# Patient Record
Sex: MALE | Race: WHITE | Employment: OTHER | URBAN - METROPOLITAN AREA
[De-identification: names, ages, dates, MRNs, and addresses within clinical notes are randomized per-mention and may not be internally consistent; named-entity substitution may affect disease eponyms.]

---

## 2018-08-07 ENCOUNTER — OFFICE VISIT (OUTPATIENT)
Dept: FAMILY MEDICINE CLINIC | Facility: CLINIC | Age: 58
End: 2018-08-07

## 2018-08-07 VITALS
OXYGEN SATURATION: 97 % | SYSTOLIC BLOOD PRESSURE: 121 MMHG | BODY MASS INDEX: 39.17 KG/M2 | HEART RATE: 73 BPM | TEMPERATURE: 98.2 F | DIASTOLIC BLOOD PRESSURE: 81 MMHG | HEIGHT: 75 IN | WEIGHT: 315 LBS | RESPIRATION RATE: 17 BRPM

## 2018-08-07 DIAGNOSIS — Z23 ENCOUNTER FOR IMMUNIZATION: ICD-10-CM

## 2018-08-07 DIAGNOSIS — I10 ESSENTIAL HYPERTENSION: Primary | ICD-10-CM

## 2018-08-07 RX ORDER — DILTIAZEM HYDROCHLORIDE 240 MG/1
240 CAPSULE, COATED, EXTENDED RELEASE ORAL DAILY
Qty: 90 CAP | Refills: 1 | Status: SHIPPED | OUTPATIENT
Start: 2018-08-07 | End: 2019-03-01 | Stop reason: SDUPTHER

## 2018-08-07 RX ORDER — METFORMIN HYDROCHLORIDE 500 MG/1
500 TABLET ORAL 2 TIMES DAILY WITH MEALS
COMMUNITY
End: 2018-08-29

## 2018-08-07 RX ORDER — ALBUTEROL SULFATE 4 MG/1
1 TABLET ORAL 2 TIMES DAILY
COMMUNITY
Start: 2018-06-19 | End: 2018-08-29

## 2018-08-07 NOTE — MR AVS SNAPSHOT
[address] 
 
 
 [address] [facility] [address] 
[phone] Patient: Nakita Arteaga MRN: [id number] LAKEISHA:[id number] Visit Information Date & Time Provider Department Dept. Phone Encounter #  
 8/7/2018 10:30 AM LALITHA Suarez [facility] [phone] [id number] Follow-up Instructions Return in about 3 months (around 11/7/2018) for HTN. Your Appointments 8/29/2018  8:00 AM  
PHYSICAL with Malik Rowland MD  
[facility]) Appt Note: physical  
 [address] [facility] [id number]  
[address] [address] Upcoming Health Maintenance Date Due Hepatitis C Screening 1960 DTaP/Tdap/Td series (1 - Tdap) 8/13/1979 FOBT Q 1 YEAR AGE 50-75 8/13/2008 ZOSTER VACCINE AGE 60> 6/13/2018 Influenza Age 5 to Adult 8/1/2018 Allergies as of 8/7/2018  Review Complete On: 8/7/2018 By: Beto Blunt Severity Noted Reaction Type Reactions Gabapentin High 08/07/2018   Side Effect Other (comments) Mental Disturbances Lyrica [Pregabalin] High 08/07/2018   Side Effect Other (comments) Mental Disturbances Current Immunizations  Never Reviewed Name Date Influenza Vaccine 9/6/2015 Pneumococcal Vaccine (Unspecified Type) 9/6/2014 Not reviewed this visit You Were Diagnosed With   
  
 Codes Comments Essential hypertension    -  Primary ICD-10-CM: I10 
ICD-9-CM: 401.9 BMI 40.0-44.9, adult [facility])     ICD-10-CM: Z68.41 
ICD-9-CM: V85.41 Encounter for immunization     ICD-10-CM: Z69 ICD-9-CM: V03.89 Vitals BP Pulse Temp Resp Height(growth percentile) Weight(growth percentile) 121/81 (BP 1 Location: Right arm, BP Patient Position: Sitting) 73 98.2 °F (36.8 °C) (Oral) 17 6' 3\" (1.905 m) 349 lb (158.3 kg) SpO2 BMI Smoking Status 97% 43.62 kg/m2 Former Smoker Vitals History BMI and BSA Data Body Mass Index Body Surface Area  
 43.62 kg/m 2 2.89 m 2 Preferred Pharmacy Pharmacy Name Phone [phone], [address] [place] [address] [phone] Your Updated Medication List  
  
   
This list is accurate as of 18 11:34 AM.  Always use your most recent med list.  
  
  
  
  
 albuterol 4 mg tablet Commonly known as:  PROVENTIL Take 1 Tab by mouth two (2) times a day. dilTIAZem  mg ER capsule Commonly known as:  CARTIA XT Take 1 Cap by mouth daily. gabapentin 300 mg capsule Commonly known as:  NEURONTIN Take 300 mg by mouth three (3) times daily. metFORMIN 500 mg tablet Commonly known as:  GLUCOPHAGE Take 500 mg by mouth two (2) times daily (with meals). multivitamin tablet Commonly known as:  ONE A DAY Take 1 Tab by mouth daily. oxyCODONE-acetaminophen 5-325 mg per tablet Commonly known as:  PERCOCET Take 1 Tab by mouth every four (4) hours as needed for Pain. Max Daily Amount: 6 Tabs. OXYCONTIN PO Take  by mouth.  
  
 varicella-zoster recombinant (PF) 50 mcg/0.5 mL Susr injection Commonly known as:  SHINGRIX  
0.5 mL by IntraMUSCular route once for 1 dose. Prescriptions Sent to Pharmacy Refills  
 dilTIAZem CD (CARTIA XT) 240 mg ER capsule 1 Sig: Take 1 Cap by mouth daily. Class: Normal  
 Pharmacy: [address] [place] [address]. Ph #: [phone] Route: Oral  
 varicella-zoster recombinant, PF, (SHINGRIX) 50 mcg/0.5 mL susr injection 0 Si.5 mL by IntraMUSCular route once for 1 dose. Class: Normal  
 Pharmacy: [address] [place] [address]. Ph #: [phone] Route: IntraMUSCular Follow-up Instructions Return in about 3 months (around 2018) for HTN. Introducing [facility]! Carina Vega introduces [employer] patient portal. Now you can access parts of your medical record, email your doctor's office, and request medication refills online. 1. In your internet browser, go to [email or web address] 2. Click on the First Time User? Click Here link in the Sign In box. You will see the New Member Sign Up page. 3. Enter your [employer] Access Code exactly as it appears below. You will not need to use this code after youve completed the sign-up process. If you do not sign up before the expiration date, you must request a new code. · [employer] Access Code: [email or web address] Expires: 11/5/2018 11:34 AM 
 
4. Enter the last four digits of your Social Security Number (xxxx) and Date of Birth (mm/dd/yyyy) as indicated and click Submit. You will be taken to the next sign-up page. 5. Create a [employer] ID. This will be your [employer] login ID and cannot be changed, so think of one that is secure and easy to remember. 6. Create a [employer] password. You can change your password at any time. 7. Enter your Password Reset Question and Answer. This can be used at a later time if you forget your password. 8. Enter your e-mail address. You will receive e-mail notification when new information is available in [address] 9. Click Sign Up. You can now view and download portions of your medical record. 10. Click the Download Summary menu link to download a portable copy of your medical information. If you have questions, please visit the Frequently Asked Questions section of the [employer] website. Remember, [employer] is NOT to be used for urgent needs. For medical emergencies, dial 911. Now available from your iPhone and Android! Please provide this summary of care documentation to your next provider. Your primary care clinician is listed as Moise Mayorga. If you have any questions after today's visit, please call [phone].

## 2018-08-07 NOTE — PROGRESS NOTES
Berkley Logan is a 59 y.o.  male presents today for office visit for   Chief Complaint   Patient presents with    Hypertension   Pt is fasting. Pt is in Room # 5.      1. Have you been to the ER, urgent care clinic since your last visit? Hospitalized since your last visit? No    2. Have you seen or consulted any other health care providers outside of the [address] [place] [address] since your last visit? Include any pap smears or colon screening. Yes Dermatology 3/2018, Weight Loss Program 7/2018    Health Maintenance reviewed - pending records from Dr. Samanta Christine office. Requested Prescriptions     Pending Prescriptions Disp Refills    dilTIAZem XR (DILACOR XR) 180 mg XR capsule       Sig: Take 1 Cap by mouth daily.        Visit Vitals    /81 (BP 1 Location: Right arm, BP Patient Position: Sitting)    Pulse 73    Temp 98.2 °F (36.8 °C) (Oral)    Resp 17    Ht 6' 3\" (1.905 m)    Wt 349 lb (158.3 kg)    SpO2 97%    BMI 43.62 kg/m2         Upcoming Appts  Yes Weight Loss 8/2018

## 2018-08-07 NOTE — PROGRESS NOTES
History:   Clay Hill is a 59 y.o. male presenting today for an initial visit. Mr. Deborah Leonard presents today to establish care. History includes HTN, morbid obesity, and chronic pain. In terms of HTN, he is taking Cartia XT daily with good control of BP. He denies headache, dizziness, SOB, chest pain, palpitations, orthopnea, pnd, or leg swelling. He is currently followed by a weight loss specialist for history of morbid obesity. Current BMI is 43.62. He reports an 8 lbs intentional weight loss over the past 2 weeks. Pt reports that he is a retired  and sustained multiple injuries over the years involving his neck, back, hips, knees, and shoulder during his service requiring 52 orthopedic surgeries over several years. He was previously followed by pain management and was treated with oxycodone. Pt reports that he no longer takes opioid medication and that his pain is controlled. He has no acute concerns/complaints at this time. Health maintenance: Pt reports that he will obtain the influenza vaccine. He states that he is up to date with tetanus. Reports prior colonoscopy and PSA level performed recently (no records available at this time). Past Medical History:   Diagnosis Date    Cervical pain (neck)     Chronic pain     Headache     Hypertension        Past Surgical History:   Procedure Laterality Date    HX ANKLE FRACTURE TX Left     SX. X4    HX CERVICAL DISKECTOMY      HX CERVICAL FUSION  2004    HX HIP REPLACEMENT Left Feb. 2015    after fall on ice    HX KNEE ARTHROSCOPY      HX LUMBAR DISKECTOMY  1990    L4-5, L5-S1 decompression    HX ORTHOPAEDIC      FEMUR FX    HX SHOULDER ARTHROSCOPY         Social History     Social History    Marital status:      Spouse name: N/A    Number of children: N/A    Years of education: N/A     Occupational History    Not on file.      Social History Main Topics    Smoking status: Former Smoker     Packs/day: 1.00 Years: 10.00     Quit date: 1/1/1980    Smokeless tobacco: Never Used    Alcohol use Yes      Comment: rarely    Drug use: No    Sexual activity: Yes     Partners: Female     Birth control/ protection: None     Other Topics Concern    Not on file     Social History Narrative       Family History   Problem Relation Age of Onset    Diabetes Mother     Cancer Mother     Arthritis-osteo Mother     Alcohol abuse Father     Cancer Sister      Non -small cell lung cancer    Cancer Brother      Micheal    No Known Problems Brother     No Known Problems Brother     No Known Problems Sister     No Known Problems Child     No Known Problems Child        Current Outpatient Prescriptions on File Prior to Visit   Medication Sig Dispense Refill    diltiazem XR (DILACOR XR) 180 mg XR capsule Take 240 mg by mouth daily.  multivitamin (ONE A DAY) tablet Take 1 Tab by mouth daily.  oxyCODONE-acetaminophen (PERCOCET) 5-325 mg per tablet Take 1 Tab by mouth every four (4) hours as needed for Pain. Max Daily Amount: 6 Tabs. 40 Tab 0    gabapentin (NEURONTIN) 300 mg capsule Take 300 mg by mouth three (3) times daily.  OXYCODONE HCL (OXYCONTIN PO) Take  by mouth. No current facility-administered medications on file prior to visit. Allergies   Allergen Reactions    Gabapentin Other (comments)     Mental Disturbances    Lyrica [Pregabalin] Other (comments)     Mental Disturbances       Review of Systems   Constitutional: Negative. HENT: Negative. Eyes: Negative. Respiratory: Negative. Cardiovascular: Negative. Gastrointestinal: Negative. Genitourinary: Negative. Musculoskeletal: Negative. Skin: Negative. Neurological: Negative. Endo/Heme/Allergies: Negative. Psychiatric/Behavioral: Negative.         Objective:   VS:    Visit Vitals    /81 (BP 1 Location: Right arm, BP Patient Position: Sitting)    Pulse 73    Temp 98.2 °F (36.8 °C) (Oral)    Resp 17  Ht 6' 3\" (1.905 m)    Wt 349 lb (158.3 kg)    SpO2 97%    BMI 43.62 kg/m2     Physical Exam   Constitutional: He is oriented to person, place, and time. He appears well-developed and well-nourished. Pt is morbidly obese   HENT:   Head: Normocephalic and atraumatic. Mouth/Throat: Oropharynx is clear and moist.   Eyes: Conjunctivae and EOM are normal. Pupils are equal, round, and reactive to light. Neck: Normal range of motion. Neck supple. Cardiovascular: Normal rate, regular rhythm, normal heart sounds and intact distal pulses. Pulmonary/Chest: Effort normal and breath sounds normal.   Abdominal: Soft. Bowel sounds are normal. He exhibits no distension. There is no tenderness. Musculoskeletal: Normal range of motion. He exhibits no edema. Neurological: He is alert and oriented to person, place, and time. Skin: Skin is warm and dry. Nursing note and vitals reviewed. Assessment/ Plan:     Diagnoses and all orders for this visit:    1. Essential hypertension        -     Controlled  -     dilTIAZem CD (CARTIA XT) 240 mg ER capsule; Take 1 Cap by mouth daily. 2. BMI 40.0-44.9, adult (HCC)        -     Continue efforts with diet and exercise        -     Follow up with weight loss specialist as directed    3. Encounter for immunization  -     varicella-zoster recombinant, PF, (SHINGRIX) 50 mcg/0.5 mL susr injection; 0.5 mL by IntraMUSCular route once for 1 dose. I have discussed the diagnosis with the patient and the intended plan as seen in the above orders. The patient verbalized understanding and agrees with the plan.       Follow-up Disposition: Not on [facility], MD

## 2018-08-29 ENCOUNTER — OFFICE VISIT (OUTPATIENT)
Dept: FAMILY MEDICINE CLINIC | Facility: CLINIC | Age: 58
End: 2018-08-29

## 2018-08-29 VITALS
HEART RATE: 70 BPM | WEIGHT: 315 LBS | SYSTOLIC BLOOD PRESSURE: 125 MMHG | DIASTOLIC BLOOD PRESSURE: 80 MMHG | OXYGEN SATURATION: 95 % | RESPIRATION RATE: 18 BRPM | HEIGHT: 75 IN | TEMPERATURE: 98.3 F | BODY MASS INDEX: 39.17 KG/M2

## 2018-08-29 DIAGNOSIS — Z00.00 ROUTINE GENERAL MEDICAL EXAMINATION AT A HEALTH CARE FACILITY: Primary | ICD-10-CM

## 2018-08-29 DIAGNOSIS — I10 ESSENTIAL HYPERTENSION: ICD-10-CM

## 2018-08-29 DIAGNOSIS — Z23 ENCOUNTER FOR IMMUNIZATION: ICD-10-CM

## 2018-08-29 DIAGNOSIS — M48.061 SPINAL STENOSIS OF LUMBAR REGION, UNSPECIFIED WHETHER NEUROGENIC CLAUDICATION PRESENT: ICD-10-CM

## 2018-08-29 DIAGNOSIS — E66.01 OBESITY, CLASS III, BMI 40-49.9 (MORBID OBESITY) (HCC): ICD-10-CM

## 2018-08-29 DIAGNOSIS — Z12.5 SCREENING FOR PROSTATE CANCER: ICD-10-CM

## 2018-08-29 DIAGNOSIS — Z13.31 SCREENING FOR DEPRESSION: ICD-10-CM

## 2018-08-29 RX ORDER — METAPROTERENOL SULFATE 20 MG
TABLET ORAL
COMMUNITY

## 2018-08-29 RX ORDER — ASPIRIN 81 MG/1
81 TABLET ORAL DAILY
COMMUNITY

## 2018-08-29 RX ORDER — ZOSTER VACCINE RECOMBINANT, ADJUVANTED 50 MCG/0.5
KIT INTRAMUSCULAR
COMMUNITY
Start: 2018-08-14 | End: 2018-08-29

## 2018-08-29 NOTE — PATIENT INSTRUCTIONS
Vaccine Information Statement Influenza (Flu) Vaccine (Inactivated or Recombinant): What you need to know Many Vaccine Information Statements are available in Green[place]ic and other languages. See www.immunize.org/vis Hojas de Información Sobre Vacunas están disponibles en Español y en muchos otros idiomas. Visite www.immunize.org/vis 1. Why get vaccinated? Influenza (flu) is a contagious disease that spreads around the [place] every year, usually between October and May. Flu is caused by influenza viruses, and is spread mainly by coughing, sneezing, and close contact. Anyone can get flu. Flu strikes suddenly and can last several days. Symptoms vary by age, but can include: 
 fever/chills  sore throat  muscle aches  fatigue  cough  headache  runny or stuffy nose Flu can also lead to pneumonia and blood infections, and cause diarrhea and seizures in children. If you have a medical condition, such as heart or lung disease, flu can make it worse. Flu is more dangerous for some people. Infants and young children, people 72years of age and older, pregnant women, and people with certain health conditions or a weakened immune system are at greatest risk. Each year thousands of people in the [facility] die from flu, and many more are hospitalized. Flu vaccine can: 
 keep you from getting flu, 
 make flu less severe if you do get it, and 
 keep you from spreading flu to your family and other people. 2. Inactivated and recombinant flu vaccines A dose of flu vaccine is recommended every flu season. Children 6 months through 6years of age may need two doses during the same flu season. Everyone else needs only one dose each flu season.   
 
 
Some inactivated flu vaccines contain a very small amount of a mercury-based preservative called thimerosal. Studies have not shown thimerosal in vaccines to be harmful, but flu vaccines that do not contain thimerosal are available. There is no live flu virus in flu shots. They cannot cause the flu. There are many flu viruses, and they are always changing. Each year a new flu vaccine is made to protect against three or four viruses that are likely to cause disease in the upcoming flu season. But even when the vaccine doesnt exactly match these viruses, it may still provide some protection Flu vaccine cannot prevent: 
 flu that is caused by a virus not covered by the vaccine, or 
 illnesses that look like flu but are not. It takes about 2 weeks for protection to develop after vaccination, and protection lasts through the flu season. 3. Some people should not get this vaccine Tell the person who is giving you the vaccine:  If you have any severe, life-threatening allergies. If you ever had a life-threatening allergic reaction after a dose of flu vaccine, or have a severe allergy to any part of this vaccine, you may be advised not to get vaccinated. Most, but not all, types of flu vaccine contain a small amount of egg protein.  If you ever had Guillain-Barré Syndrome (also called GBS). Some people with a history of GBS should not get this vaccine. This should be discussed with your doctor.  If you are not feeling well. It is usually okay to get flu vaccine when you have a mild illness, but you might be asked to come back when you feel better. 4. Risks of a vaccine reaction With any medicine, including vaccines, there is a chance of reactions. These are usually mild and go away on their own, but serious reactions are also possible. Most people who get a flu shot do not have any problems with it. Minor problems following a flu shot include:  
 soreness, redness, or swelling where the shot was given  hoarseness  sore, red or itchy eyes  cough  fever  aches  headache  itching  fatigue If these problems occur, they usually begin soon after the shot and last 1 or 2 days. More serious problems following a flu shot can include the following:  There may be a small increased risk of Guillain-Barré Syndrome (GBS) after inactivated flu vaccine. This risk has been estimated at 1 or 2 additional cases per million people vaccinated. This is much lower than the risk of severe complications from flu, which can be prevented by flu vaccine.  Young children who get the flu shot along with pneumococcal vaccine (PCV13) and/or DTaP vaccine at the same time might be slightly more likely to have a seizure caused by fever. Ask your doctor for more information. Tell your doctor if a child who is getting flu vaccine has ever had a seizure. Problems that could happen after any injected vaccine:  People sometimes faint after a medical procedure, including vaccination. Sitting or lying down for about 15 minutes can help prevent fainting, and injuries caused by a fall. Tell your doctor if you feel dizzy, or have vision changes or ringing in the ears.  Some people get severe pain in the shoulder and have difficulty moving the arm where a shot was given. This happens very rarely.  Any medication can cause a severe allergic reaction. Such reactions from a vaccine are very rare, estimated at about 1 in a million doses, and would happen within a few minutes to a few hours after the vaccination. As with any medicine, there is a very remote chance of a vaccine causing a serious injury or death. The safety of vaccines is always being monitored. For more information, visit: www.cdc.gov/vaccinesafety/ 
 
5. What if there is a serious reaction? What should I look for?  Look for anything that concerns you, such as signs of a severe allergic reaction, very high fever, or unusual behavior.  
 
Signs of a severe allergic reaction can include hives, swelling of the face and throat, difficulty breathing, a fast heartbeat, dizziness, and weakness  usually within a few minutes to a few hours after the vaccination. What should I do?  If you think it is a severe allergic reaction or other emergency that cant wait, call 9-1-1 and get the person to the nearest hospital. Otherwise, call your doctor.  Reactions should be reported to the Vaccine Adverse Event Reporting System (VAERS). Your doctor should file this report, or you can do it yourself through  the VAERS web site at www.vaers. [facility].gov, or by calling 2-234.497.4937. VAERS does not give medical advice. 6. The National Vaccine Injury Compensation Program 
 
The [facility] Vaccine Injury Compensation Program (VICP) is a federal program that was created to compensate people who may have been injured by certain vaccines. Persons who believe they may have been injured by a vaccine can learn about the program and about filing a claim by calling 6-449.850.1565 or visiting the [employer] website at www.[facility].gov/vaccinecompensation. There is a time limit to file a claim for compensation. 7. How can I learn more?  Ask your healthcare provider. He or she can give you the vaccine package insert or suggest other sources of information.  Call your local or state health department.  Contact the Centers for Disease Control and Prevention (CDC): 
- Call 2-910.601.8813 (1-800-CDC-INFO) or 
- Visit CDCs website at www.cdc.gov/flu Vaccine Information Statement Inactivated Influenza Vaccine 8/7/2015 
42 JEANMARIE Blackwell [id number]-20 Department of [facility] and [employer] Centers for Disease Control and Prevention Office Use Only

## 2018-08-29 NOTE — PROGRESS NOTES
History and Physical 
 
Today's Date:  2018 Patient's Name: Gregoria Juárez Patient's :  1960 History: Chief Complaint Patient presents with  Annual Exam  
 Hypertension  Weight Management  Immunization/Injection Hypertension This is a chronic problem, new to me. BP is at goal. Pt takes dilitiazem. Pt reports compliance with this medication. Obesity Class III This is a chronic problem, new to me. This is not at goal. Pt went to the [facility] Weight loss program. He was prescribed albuterol and metformin. These are not effective. Diffuse pain This is a chronic problem, new to me. This is not at goal. Pt reports pain all over, especially his neck. +paresthesias. Pt takes glucosamine. Past Medical History:  
Diagnosis Date  Cervical pain (neck)  Chronic pain  Headache  Hypertension Past Surgical History:  
Procedure Laterality Date  HX ANKLE FRACTURE TX Left SX. X4  
 HX CERVICAL DISKECTOMY  HX CERVICAL FUSION    HX HIP REPLACEMENT Left 2015  
 after fall on ice  HX KNEE ARTHROSCOPY    
 HX LUMBAR DISKECTOMY   L4-5, L5-S1 decompression  HX ORTHOPAEDIC FEMUR FX  
 HX SHOULDER ARTHROSCOPY    
 
 reports that he quit smoking about 38 years ago. He has a 10.00 pack-year smoking history. He has never used smokeless tobacco. He reports that he drinks alcohol. He reports that he does not use illicit drugs. Family History Problem Relation Age of Onset  Diabetes Mother  Cancer Mother Viri Aydee Arthritis-osteo Mother  Alcohol abuse Father  Cancer Sister Non -small cell lung cancer  Cancer Brother Koki Romero  No Known Problems Brother  No Known Problems Brother  No Known Problems Sister  No Known Problems Child  No Known Problems Child Allergies Allergen Reactions  Gabapentin Other (comments) Mental Disturbances  Lyrica [Pregabalin] Other (comments) Mental Disturbances Problem List:  
  
Patient Active Problem List  
Diagnosis Code  Lumbar stenosis M48.061  
 Hypertension I10  
 Obesity, Class III, BMI 40-49.9 (morbid obesity) ([facility]) E66.01 Medications:  
 
Current Outpatient Prescriptions Medication Sig  
 aspirin delayed-release 81 mg tablet Take  by mouth daily.  Vsolhjsc-Fler-Kedjsk-Hyalur Ac (JOINT SUPPORT) 397-254-55-2 mg cap Take  by mouth.  dilTIAZem CD (CARTIA XT) 240 mg ER capsule Take 1 Cap by mouth daily.  multivitamin (ONE A DAY) tablet Take 1 Tab by mouth daily. No current facility-administered medications for this visit. Review of Systems:  
(Positives in bold) General:   fevers, chills, generalized weakness, fatigue, weight gain, night sweats, appetite change Neurologic: dizziness, lightheadedness, headaches, loss of consciousness, numbness, tingling (b/l legs and feet, right more than left), focal weakness Eyes:  vision changes, double vision, photophobia Ears:  change in hearing (b/l, saw ENT), ear pain, ear discharge, ear ringing Nose:  sneezing, runny nose, nasal congestion Mouth/Throat: sore throat, voice change, dry mouth, difficulty swallowing Neck:  pain, stiffness, swelling Respiratory: dyspnea at rest, dyspnea on exertion, wheezing, cough, sputum production Cardiovascular:   chest pain, palpitations, pedal edema, leg cramps Gastrointestinal:  nausea, vomiting, abdominal pain, constipation, diarrhea (attributed to metformin), heart burn, bloody stools, tarry black stools, rectal pain, hemorrhoids Urinary: dysuria, urinary frequency, nocturia, malodorous urine, difficulty initiating flow, slow urine stream 
Genital (M): penile discharge, ulcerations, rashes, erectile dysfunction Musculoskeletal:  joint pain, joint stiffness, joint swelling, back pain, focal muscle pain, diffuse myalgias Psychiatric: insomnia, anxiety, depression, hallucinations, suicidal ideation, homicidal ideation Endocrine: polydipsia, polyuria, polyphagia, cold intolerance, heat intolerance Hematologic: easy bruising, easy bleeding Dermatologic: Itching, rash Physical Assessment:  
VS:   
Visit Vitals  /80 (BP 1 Location: Right arm, BP Patient Position: Sitting)  Pulse 70  Temp 98.3 °F (36.8 °C) (Oral)  Resp 18  Ht 6' 3\" (1.905 m)  Wt 347 lb 9.6 oz (157.7 kg)  SpO2 95%  BMI 43.45 kg/m2 General:   Well-groomed, well-nourished, in no distress, pleasant, alert, appropriate and conversant. Eyes:    PERRL, EOMI. Mouth:  MMM, good dentition, oropharynx WNL without membranes, exudates, petechiae or ulcers Neck:   Neck supple, no swelling, mass or tenderness Cardiovascular:   No JVD. RRR, no MRG. Pulmonary:   Lungs clear bilaterally. Normal respiratory effort. Abdomen:   Abdomen soft, NT, ND, NAB Extremities:   No edema, LEs warm and well-perfused. Neuro:   Alert and oriented, no focal deficits. No facial asymmetry noted. Skin:    No rash or jaundice MSK:   Normal ROM, 5/5 muscle strength Psych:  No pressured speech or abnormal thought content PHQ over the last two weeks 8/29/2018 Little interest or pleasure in doing things Not at all Feeling down, depressed, irritable, or hopeless Not at all Total Score PHQ 2 0 Lab Results Component Value Date/Time WBC 5.5 02/26/2016 10:55 AM  
 HGB 14.9 02/26/2016 10:55 AM  
 HCT 45.6 02/26/2016 10:55 AM  
 PLATELET 728 40/73/5201 10:55 AM  
 MCV 88.9 02/26/2016 10:55 AM  
 
Lab Results Component Value Date/Time Glucose 93 02/26/2016 10:55 AM  
 Creatinine 0.96 02/26/2016 10:55 AM  
  
No results found for: CHOL, CHOLPOCT, HDL, LDL, LDLC, LDLCPOC, LDLCEXT, TRIGL, TGLPOCT, CHHD, CHHDX Lab Results Component Value Date/Time ALT (SGPT) 51 02/26/2016 10:55 AM  
 AST (SGOT) 32 02/26/2016 10:55 AM  
 Alk.  phosphatase 62 02/26/2016 10:55 AM  
 Bilirubin, total 0.8 02/26/2016 10:55 AM  
 Albumin 4.0 02/26/2016 10:55 AM  
 Protein, total 6.9 02/26/2016 10:55 AM  
 INR 1.0 02/26/2016 10:55 AM  
 Prothrombin time 13.4 02/26/2016 10:55 AM  
 PLATELET 502 01/21/6521 10:55 AM  
 
 
Lab Results Component Value Date/Time GFR est non-AA >60 02/26/2016 10:55 AM  
 GFR est AA >60 02/26/2016 10:55 AM  
 Creatinine 0.96 02/26/2016 10:55 AM  
 BUN 15 02/26/2016 10:55 AM  
 Sodium 142 02/26/2016 10:55 AM  
 Potassium 4.4 02/26/2016 10:55 AM  
 Chloride 108 02/26/2016 10:55 AM  
 CO2 28 02/26/2016 10:55 AM  
 
No results found for: TSH, TSH2, TSH3, TSHP, TSHELE, TSHEXT, TT3, T3U, T3UP, FRT3, FT3, FT4, FT4P, T4, T4P, FT4T, TT7, TSHEXT, TSHEXT Lab Results Component Value Date/Time Glucose 93 02/26/2016 10:55 AM  
  
Assessment/Plan & Orders: ICD-10-CM ICD-9-CM 1. Routine general medical examination at a health care facility Z00.00 V70.0 CBC WITH AUTOMATED DIFF  
   LIPID PANEL  
   METABOLIC PANEL, COMPREHENSIVE  
   TSH 3RD GENERATION  
   T4, FREE  
   HEMOGLOBIN A1C WITH EAG  
   CBC WITH AUTOMATED DIFF  
   LIPID PANEL  
   METABOLIC PANEL, COMPREHENSIVE  
   TSH 3RD GENERATION  
   T4, FREE  
   HEMOGLOBIN A1C WITH EAG 2. Essential hypertension I10 401.9 3. Obesity, Class III, BMI 40-49.9 (morbid obesity) ([facility]) E66.01 278.01   
4. Spinal stenosis of lumbar region, unspecified whether neurogenic claudication present M48.061 724.02   
5. Screening for prostate cancer Z12.5 V76.44 PSA, DIAGNOSTIC (PROSTATE SPECIFIC AG) PSA, DIAGNOSTIC (PROSTATE SPECIFIC AG) 6. Encounter for immunization Z23 V03.89 INFLUENZA VIRUS VAC QUAD,SPLIT,PRESV FREE SYRINGE IM  
7. Screening for depression Z13.89 V79.0 LA DEPRESSION SCREEN ANNUAL  
 
HM Colon cancer: Colonoscopy due at age 46 Dyslipidemia: check fasting lipid panel Diabetes mellitus: check A1c Influenza vaccine: will be done today Pneumococcal vaccine: due at age 72 Tdap: up to date Herpes Zoster vaccine: due at age 61 
 Hep B vaccine: not indicated (liver dz, DM 19-59) Weight:  Body mass index is 43.45 kg/(m^2). Discussed the patient's BMI with him. The BMI follow up plan is as follows: improve diet and exercise at least 30 min a day five times a week Prostate cancer:  Discuss re: screening with PSA between ages 48 and 71 AAA:  One-time abdominal US if current or former smoker aged 72 to 76 years Osteoporosis: No indication for dexa scan Healthy lifestyle has been encouraged including avoidance of tobacco, limiting or avoiding alcohol intake, heart healthy diet which is low in cholesterol and saturated fat and contains fresh fruits, vegetables and whole grains and fiber, regular exercise with goals of 20-30 minutes 3-5 days weekly and maintaining an optimal BMI. Information given on ketogenic/IF diet with exercise Depression screenin18 Follow-up Disposition: 
Return in about 4 weeks (around 2018) for Follow up hyperlipidemia, Follow up weight management, Follow up pain, Go over lab/imaging results. *Patient verbalized understanding and agreement with the plan. Patient was given an after-visit summary. Ameya Martinez. [address] Beckford MD - Internal Medicine 2018, 8:35 AM 
[facility] [address] [facility], [address] Phone [phone] Fax [phone]

## 2018-08-29 NOTE — MR AVS SNAPSHOT
[address] 
 
 
 [address] [facility] [address] [address] 
[phone] Patient: Magalie Vasquez MRN: [id number] [facility] Visit Information Date & Time Provider Department Dept. Phone Encounter #  
 2018  8:00 AM Sanjuana Grimaldo  Dania Montiel [phone] [id number] Your Appointments 2018  8:15 AM  
Follow Up with Sanjuana Grimaldo MD  
[facility]) Appt Note: 1 Month Follow up [address] [facility] [address] [id number]  
[address] [address] Upcoming Health Maintenance Date Due Hepatitis C Screening 1960 DTaP/Tdap/Td series (1 - Tdap) 1979 FOBT Q 1 YEAR AGE 50-75 2008 ZOSTER VACCINE AGE 60> 2018 Influenza Age 5 to Adult 2018 Allergies as of 2018  Review Complete On: 2018 By: Sanjuana Grimaldo MD  
  
 Severity Noted Reaction Type Reactions Gabapentin High 2018   Side Effect Other (comments) Mental Disturbances Lyrica [Pregabalin] High 2018   Side Effect Other (comments) Mental Disturbances Current Immunizations  Never Reviewed Name Date Influenza Vaccine 2015 Influenza Vaccine (Quad) PF 2018 Pneumococcal Vaccine (Unspecified Type) 2014 Zoster Recombinant 2018 Not reviewed this visit You Were Diagnosed With   
  
 Codes Comments Routine general medical examination at a health care facility    -  Primary ICD-10-CM: Z00.00 ICD-9-CM: V70.0 Essential hypertension     ICD-10-CM: I10 
ICD-9-CM: 401.9 Obesity, Class III, BMI 40-49.9 (morbid obesity) (HCC)     ICD-10-CM: E66.01 
ICD-9-CM: 278.01 Spinal stenosis of lumbar region, unspecified whether neurogenic claudication present     ICD-10-CM: M48.061 
ICD-9-CM: 724.02 Screening for prostate cancer     ICD-10-CM: Z12.5 ICD-9-CM: V76.44   
 Encounter for immunization     ICD-10-CM: U07 ICD-9-CM: V03.89 Vitals BP Pulse Temp Resp Height(growth percentile) Weight(growth percentile) 125/80 (BP 1 Location: Right arm, BP Patient Position: Sitting) 70 98.3 °F (36.8 °C) (Oral) 18 6' 3\" (1.905 m) 347 lb 9.6 oz (157.7 kg) SpO2 BMI Smoking Status 95% 43.45 kg/m2 Former Smoker Vitals History BMI and BSA Data Body Mass Index Body Surface Area  
 43.45 kg/m 2 2.89 m 2 Preferred Pharmacy Pharmacy Name Phone [phone], [address] [phone] Your Updated Medication List  
  
   
This list is accurate as of 8/29/18  9:13 AM.  Always use your most recent med list.  
  
  
  
  
 aspirin delayed-release 81 mg tablet Take  by mouth daily. dilTIAZem  mg ER capsule Commonly known as:  CARTIA XT Take 1 Cap by mouth daily. JOINT SUPPORT 441-896-06-2 mg Cap Generic drug:  Exzdetqu-Yoyo-Tlgrtw-Hyalur Ac Take  by mouth.  
  
 multivitamin tablet Commonly known as:  ONE A DAY Take 1 Tab by mouth daily. SHINGRIX (PF) 50 mcg/0.5 mL Susr injection Generic drug:  varicella-zoster recombinant (PF) We Performed the Following CBC WITH AUTOMATED DIFF [85308 CPT(R)] HEMOGLOBIN A1C WITH EAG [50668 CPT(R)] INFLUENZA VIRUS VAC QUAD,SPLIT,PRESV FREE SYRINGE IM [id number] CPT(R)] LIPID PANEL [04652 CPT(R)] METABOLIC PANEL, COMPREHENSIVE [77310 CPT(R)] PSA, DIAGNOSTIC (PROSTATE SPECIFIC AG) [id number] CPT(R)] T4, FREE [id number] CPT(R)] TSH 3RD GENERATION [47931 CPT(R)] To-Do List   
 08/29/2018 Lab:  HEMOGLOBIN A1C WITH EAG   
  
 08/29/2018 Lab:  PSA, DIAGNOSTIC (PROSTATE SPECIFIC AG)   
  
 08/29/2018 Lab:  T4, FREE   
  
 08/29/2018 Lab:  TSH 3RD GENERATION   
  
 09/01/2018 Lab:  CBC WITH AUTOMATED DIFF   
  
 09/01/2018 Lab:  LIPID PANEL   
  
 09/01/2018 Lab: METABOLIC PANEL, COMPREHENSIVE Patient Instructions Vaccine Information Statement Influenza (Flu) Vaccine (Inactivated or Recombinant): What you need to know Many Vaccine Information Statements are available in [place] and other languages. See www.immunize.org/vis Hojas de Información Sobre Vacunas están disponibles en Español y en muchos otros idiomas. Visite www.immunize.org/vis 1. Why get vaccinated? Influenza (flu) is a contagious disease that spreads around the [place] every year, usually between October and May. Flu is caused by influenza viruses, and is spread mainly by coughing, sneezing, and close contact. Anyone can get flu. Flu strikes suddenly and can last several days. Symptoms vary by age, but can include: 
 fever/chills  sore throat  muscle aches  fatigue  cough  headache  runny or stuffy nose Flu can also lead to pneumonia and blood infections, and cause diarrhea and seizures in children. If you have a medical condition, such as heart or lung disease, flu can make it worse. Flu is more dangerous for some people. Infants and young children, people 72years of age and older, pregnant women, and people with certain health conditions or a weakened immune system are at greatest risk. Each year thousands of people in the [facility] die from flu, and many more are hospitalized. Flu vaccine can: 
 keep you from getting flu, 
 make flu less severe if you do get it, and 
 keep you from spreading flu to your family and other people. 2. Inactivated and recombinant flu vaccines A dose of flu vaccine is recommended every flu season. Children 6 months through 6years of age may need two doses during the same flu season. Everyone else needs only one dose each flu season.   
 
 
Some inactivated flu vaccines contain a very small amount of a mercury-based preservative called thimerosal. Studies have not shown thimerosal in vaccines to be harmful, but flu vaccines that do not contain thimerosal are available. There is no live flu virus in flu shots. They cannot cause the flu. There are many flu viruses, and they are always changing. Each year a new flu vaccine is made to protect against three or four viruses that are likely to cause disease in the upcoming flu season. But even when the vaccine doesnt exactly match these viruses, it may still provide some protection Flu vaccine cannot prevent: 
 flu that is caused by a virus not covered by the vaccine, or 
 illnesses that look like flu but are not. It takes about 2 weeks for protection to develop after vaccination, and protection lasts through the flu season. 3. Some people should not get this vaccine Tell the person who is giving you the vaccine:  If you have any severe, life-threatening allergies. If you ever had a life-threatening allergic reaction after a dose of flu vaccine, or have a severe allergy to any part of this vaccine, you may be advised not to get vaccinated. Most, but not all, types of flu vaccine contain a small amount of egg protein.  If you ever had Guillain-Barré Syndrome (also called GBS). Some people with a history of GBS should not get this vaccine. This should be discussed with your doctor.  If you are not feeling well. It is usually okay to get flu vaccine when you have a mild illness, but you might be asked to come back when you feel better. 4. Risks of a vaccine reaction With any medicine, including vaccines, there is a chance of reactions. These are usually mild and go away on their own, but serious reactions are also possible. Most people who get a flu shot do not have any problems with it. Minor problems following a flu shot include:  
 soreness, redness, or swelling where the shot was given  hoarseness  sore, red or itchy eyes  cough  fever  aches  headache  itching  fatigue If these problems occur, they usually begin soon after the shot and last 1 or 2 days. More serious problems following a flu shot can include the following:  There may be a small increased risk of Guillain-Barré Syndrome (GBS) after inactivated flu vaccine. This risk has been estimated at 1 or 2 additional cases per million people vaccinated. This is much lower than the risk of severe complications from flu, which can be prevented by flu vaccine.  Young children who get the flu shot along with pneumococcal vaccine (PCV13) and/or DTaP vaccine at the same time might be slightly more likely to have a seizure caused by fever. Ask your doctor for more information. Tell your doctor if a child who is getting flu vaccine has ever had a seizure. Problems that could happen after any injected vaccine:  People sometimes faint after a medical procedure, including vaccination. Sitting or lying down for about 15 minutes can help prevent fainting, and injuries caused by a fall. Tell your doctor if you feel dizzy, or have vision changes or ringing in the ears.  Some people get severe pain in the shoulder and have difficulty moving the arm where a shot was given. This happens very rarely.  Any medication can cause a severe allergic reaction. Such reactions from a vaccine are very rare, estimated at about 1 in a million doses, and would happen within a few minutes to a few hours after the vaccination. As with any medicine, there is a very remote chance of a vaccine causing a serious injury or death. The safety of vaccines is always being monitored. For more information, visit: www.cdc.gov/vaccinesafety/ 
 
5. What if there is a serious reaction? What should I look for?  Look for anything that concerns you, such as signs of a severe allergic reaction, very high fever, or unusual behavior.  
 
Signs of a severe allergic reaction can include hives, swelling of the face and throat, difficulty breathing, a fast heartbeat, dizziness, and weakness  usually within a few minutes to a few hours after the vaccination. What should I do?  If you think it is a severe allergic reaction or other emergency that cant wait, call  and get the person to the nearest hospital. Otherwise, call your doctor.  Reactions should be reported to the Vaccine Adverse Event Reporting System (VAERS). Your doctor should file this report, or you can do it yourself through  the VAERS web site at www.vaers. [facility].gov, or by calling 8-216.196.9831. VAERS does not give medical advice. 6. The National Vaccine Injury Compensation Program 
 
The [facility] Vaccine Injury Compensation Program (VICP) is a federal program that was created to compensate people who may have been injured by certain vaccines. Persons who believe they may have been injured by a vaccine can learn about the program and about filing a claim by calling 6-518.900.8711 or visiting the [facility] website at www.[facility].gov/vaccinecompensation. There is a time limit to file a claim for compensation. 7. How can I learn more?  Ask your healthcare provider. He or she can give you the vaccine package insert or suggest other sources of information.  Call your local or state health department.  Contact the Centers for Disease Control and Prevention (CDC): 
- Call 9-867.518.4994 (1-800-CDC-INFO) or 
- Visit CDCs website at www.cdc.gov/flu Vaccine Information Statement Inactivated Influenza Vaccine 2015 
[address] 914UL-27 Department of Health and IO.com Centers for Disease Control and Prevention Office Use Only Introducing [facility]! [employer] introduces [employer] patient portal. Now you can access parts of your medical record, email your doctor's office, and request medication refills online. 1. In your internet browser, go to [email or web address] 2. Click on the First Time User? Click Here link in the Sign In box. You will see the New Member Sign Up page. 3. Enter your fintonic Access Code exactly as it appears below. You will not need to use this code after youve completed the sign-up process. If you do not sign up before the expiration date, you must request a new code. · fintonic Access Code: [email or web address] Expires: 11/5/2018 11:34 AM 
 
4. Enter the last four digits of your Social Security Number (xxxx) and Date of Birth (mm/dd/yyyy) as indicated and click Submit. You will be taken to the next sign-up page. 5. Create a fintonic ID. This will be your fintonic login ID and cannot be changed, so think of one that is secure and easy to remember. 6. Create a fintonic password. You can change your password at any time. 7. Enter your Password Reset Question and Answer. This can be used at a later time if you forget your password. 8. Enter your e-mail address. You will receive e-mail notification when new information is available in [address] 9. Click Sign Up. You can now view and download portions of your medical record. 10. Click the Download Summary menu link to download a portable copy of your medical information. If you have questions, please visit the Frequently Asked Questions section of the fintonic website. Remember, fintonic is NOT to be used for urgent needs. For medical emergencies, dial 911. Now available from your iPhone and Android! Please provide this summary of care documentation to your next provider. Your primary care clinician is listed as Conor Garcia. If you have any questions after today's visit, please call [phone].

## 2018-08-29 NOTE — PROGRESS NOTES
Clay Hill is 59 y.o. male presents today for office visit for a complete physical. Pt is fasting. Pt is in Room# 3. 
 
1. Have you been to the ER, urgent care clinic since your last visit? Hospitalized since your last visit? NO 
 
2. Have you seen or consulted any other health care providers outside of the [facility] since your last visit? Include any pap smears or colon screening. NONE Health Maintenance reviewed and pt stated that he had a colonoscopy 2 years and that he's had a tetanus shot within the past 10 years. Upcoming Appts NONE Requested Prescriptions No prescriptions requested or ordered in this encounter Visit Vitals  /80 (BP 1 Location: Right arm, BP Patient Position: Sitting)  Pulse 70  Temp 98.3 °F (36.8 °C) (Oral)  Resp 18  Ht 6' 3\" (1.905 m)  Wt 347 lb 9.6 oz (157.7 kg)  SpO2 95%  BMI 43.45 kg/m2 Objective:  
 
Visit Vitals  /80 (BP 1 Location: Right arm, BP Patient Position: Sitting)  Pulse 70  Temp 98.3 °F (36.8 °C) (Oral)  Resp 18  Ht 6' 3\" (1.905 m)  Wt 347 lb 9.6 oz (157.7 kg)  SpO2 95%  BMI 43.45 kg/m2 Clay Hill is a 59 y.o. male who presents for routine immunizations. He denies any symptoms , reactions or allergies that would exclude them from being immunized today. Risks and adverse reactions were discussed and the VIS was given to them. All questions were addressed. He was observed for 10 min post injection. There were no reactions observed. Aydin Maldonado LPN Clay Hill is a 59 y.o. male who presents for routine immunizations. He denies any symptoms , reactions or allergies that would exclude them from being immunized today. Risks and adverse reactions were discussed and the VIS was given to them. All questions were addressed. He was observed for 10 min post injection. There were no reactions observed. Irene Quiroz LPN

## 2018-08-30 LAB
A-G RATIO,AGRAT: 2 RATIO (ref 1.1–2.6)
ABSOLUTE LYMPHOCYTE COUNT, 10803: 1.6 K/UL (ref 1–4.8)
ALBUMIN SERPL-MCNC: 4.6 G/DL (ref 3.5–5)
ALP SERPL-CCNC: 70 U/L (ref 40–125)
ALT SERPL-CCNC: 41 U/L (ref 5–40)
ANION GAP SERPL CALC-SCNC: 18 MMOL/L
AST SERPL W P-5'-P-CCNC: 36 U/L (ref 10–37)
AVG GLU, 10930: 114 MG/DL (ref 91–123)
BASOPHILS # BLD: 0.1 K/UL (ref 0–0.2)
BASOPHILS NFR BLD: 1 % (ref 0–2)
BILIRUB SERPL-MCNC: 1 MG/DL (ref 0.2–1.2)
BUN SERPL-MCNC: 14 MG/DL (ref 6–22)
CALCIUM SERPL-MCNC: 9.2 MG/DL (ref 8.4–10.4)
CHLORIDE SERPL-SCNC: 101 MMOL/L (ref 98–110)
CHOLEST SERPL-MCNC: 179 MG/DL (ref 110–200)
CO2 SERPL-SCNC: 23 MMOL/L (ref 20–32)
CREAT SERPL-MCNC: 1 MG/DL (ref 0.8–1.6)
EOSINOPHIL # BLD: 0.2 K/UL (ref 0–0.5)
EOSINOPHIL NFR BLD: 4 % (ref 0–6)
ERYTHROCYTE [DISTWIDTH] IN BLOOD BY AUTOMATED COUNT: 14.8 % (ref 10–15.5)
GFRAA, 66117: >60
GFRNA, 66118: >60
GLOBULIN,GLOB: 2.3 G/DL (ref 2–4)
GLUCOSE SERPL-MCNC: 100 MG/DL (ref 70–99)
GRANULOCYTES,GRANS: 61 % (ref 40–75)
HBA1C MFR BLD HPLC: 5.6 % (ref 4.8–5.9)
HCT VFR BLD AUTO: 48.1 % (ref 39.3–51.6)
HDLC SERPL-MCNC: 31 MG/DL (ref 40–59)
HDLC SERPL-MCNC: 5.8 MG/DL (ref 0–5)
HGB BLD-MCNC: 14.8 G/DL (ref 13.1–17.2)
LDLC SERPL CALC-MCNC: 118 MG/DL (ref 50–99)
LYMPHOCYTES, LYMLT: 26 % (ref 20–45)
MCH RBC QN AUTO: 29 PG (ref 26–34)
MCHC RBC AUTO-ENTMCNC: 31 G/DL (ref 31–36)
MCV RBC AUTO: 95 FL (ref 80–95)
MONOCYTES # BLD: 0.5 K/UL (ref 0.1–1)
MONOCYTES NFR BLD: 8 % (ref 3–12)
NEUTROPHILS # BLD AUTO: 3.8 K/UL (ref 1.8–7.7)
PLATELET # BLD AUTO: 223 K/UL (ref 140–440)
PMV BLD AUTO: 11.6 FL (ref 9–13)
POTASSIUM SERPL-SCNC: 4.5 MMOL/L (ref 3.5–5.5)
PROT SERPL-MCNC: 6.9 G/DL (ref 6.2–8.1)
PSA SERPL-MCNC: 2.23 NG/ML
RBC # BLD AUTO: 5.07 M/UL (ref 3.8–5.8)
SODIUM SERPL-SCNC: 142 MMOL/L (ref 133–145)
T4 FREE SERPL-MCNC: 1.3 NG/DL (ref 0.9–1.8)
TRIGL SERPL-MCNC: 150 MG/DL (ref 40–149)
TSH SERPL DL<=0.005 MIU/L-ACNC: 1.36 MCU/ML (ref 0.27–4.2)
VLDLC SERPL CALC-MCNC: 30 MG/DL (ref 8–30)
WBC # BLD AUTO: 6.2 K/UL (ref 4–11)

## 2018-09-12 ENCOUNTER — TELEPHONE (OUTPATIENT)
Dept: FAMILY MEDICINE CLINIC | Facility: CLINIC | Age: 58
End: 2018-09-12

## 2018-09-12 NOTE — TELEPHONE ENCOUNTER
----- Message from Malik Rowland MD sent at 9/12/2018  4:12 PM EDT -----  Result reviewed. LPN to call pt with results. Labs are normal.  2 pt identifiers confirmed and lab results relayed to pt. Pt verbalized understanding.

## 2018-11-05 ENCOUNTER — OFFICE VISIT (OUTPATIENT)
Dept: FAMILY MEDICINE CLINIC | Facility: CLINIC | Age: 58
End: 2018-11-05

## 2018-11-05 VITALS
SYSTOLIC BLOOD PRESSURE: 120 MMHG | BODY MASS INDEX: 39.17 KG/M2 | WEIGHT: 315 LBS | TEMPERATURE: 98.8 F | OXYGEN SATURATION: 97 % | RESPIRATION RATE: 18 BRPM | HEART RATE: 72 BPM | HEIGHT: 75 IN | DIASTOLIC BLOOD PRESSURE: 80 MMHG

## 2018-11-05 DIAGNOSIS — M54.42 ACUTE MIDLINE LOW BACK PAIN WITH LEFT-SIDED SCIATICA: Primary | ICD-10-CM

## 2018-11-05 DIAGNOSIS — I10 ESSENTIAL HYPERTENSION: ICD-10-CM

## 2018-11-05 DIAGNOSIS — E78.00 PURE HYPERCHOLESTEROLEMIA: ICD-10-CM

## 2018-11-05 DIAGNOSIS — E66.01 OBESITY, CLASS III, BMI 40-49.9 (MORBID OBESITY) (HCC): ICD-10-CM

## 2018-11-05 PROBLEM — E78.2 MIXED HYPERLIPIDEMIA: Status: ACTIVE | Noted: 2018-11-05

## 2018-11-05 RX ORDER — CYCLOBENZAPRINE HCL 10 MG
10 TABLET ORAL
Qty: 30 TAB | Refills: 0 | Status: SHIPPED | OUTPATIENT
Start: 2018-11-05 | End: 2018-12-03

## 2018-11-05 RX ORDER — NAPROXEN 500 MG/1
500 TABLET ORAL 2 TIMES DAILY WITH MEALS
Qty: 30 TAB | Refills: 0 | Status: SHIPPED | OUTPATIENT
Start: 2018-11-05 | End: 2018-12-03

## 2018-11-05 RX ORDER — PREDNISONE 10 MG/1
TABLET ORAL
Qty: 21 TAB | Refills: 0 | Status: SHIPPED | OUTPATIENT
Start: 2018-11-05 | End: 2018-12-03

## 2018-11-05 RX ORDER — ACETAMINOPHEN 325 MG/1
TABLET ORAL
COMMUNITY

## 2018-11-05 RX ORDER — HYDROCODONE BITARTRATE AND ACETAMINOPHEN 5; 325 MG/1; MG/1
1 TABLET ORAL
Qty: 5 TAB | Refills: 0 | Status: SHIPPED | OUTPATIENT
Start: 2018-11-05 | End: 2018-12-03

## 2018-11-05 NOTE — PROGRESS NOTES
Internal Medicine Progress Note    Today's Date:  2018   Patient:  Jose Gutierrez  Patient :  1960    Subjective:     Chief Complaint   Patient presents with    Cholesterol Problem    Weight Management    Back Pain    Results      Hypertension   This is a chronic problem. BP is at goal. Pt takes dilitiazem. Pt reports compliance with this medication.      Obesity Class III   This is a chronic problem. This is not at goal. Pt went to the [facility] Weight loss program. He was prescribed albuterol and metformin. These are not effective.      Low back pain  This is an acute on chronic problem. This is not at goal. This has been present for three days. Pain radiates down the left leg. Pt has been taking tylenol. Hyperlipidemia  This is a new problem. This is not at goal. Last FLP was checked on 2018. Pt takes no medication for this. ASCVD risk  is 11.4%.     Past Medical History:   Diagnosis Date    Cervical pain (neck)     Chronic pain     Headache     Hypertension     Mixed hyperlipidemia 2018     Past Surgical History:   Procedure Laterality Date    HX ANKLE FRACTURE TX Left     SX. X4    HX CERVICAL DISKECTOMY      HX CERVICAL FUSION      HX HIP REPLACEMENT Left Feb.     after fall on ice    HX KNEE ARTHROSCOPY      HX LUMBAR DISKECTOMY      L4-5, L5-S1 decompression    HX ORTHOPAEDIC      FEMUR FX    HX SHOULDER ARTHROSCOPY        reports that he quit smoking about 38 years ago. He has a 10.00 pack-year smoking history. he has never used smokeless tobacco. He reports that he drinks alcohol. He reports that he does not use drugs.   Family History   Problem Relation Age of Onset    Diabetes Mother     Cancer Mother    [address] [facility] Arthritis-osteo Mother     Alcohol abuse Father     Cancer Sister         Non -small cell lung cancer    Cancer Brother         Jawbone    No Known Problems Brother     No Known Problems Brother     No Known Problems Sister     No Known Problems Child     No Known Problems Child      Allergies   Allergen Reactions    Gabapentin Other (comments)     Mental Disturbances    Lyrica [Pregabalin] Other (comments)     Mental Disturbances     Review of Systems   Positives in bold  CV:      chest pain, palpitations  PULM:  SOB, wheezing, cough, sputum production    Current Outpatient Meds and Allergies     Current Outpatient Medications on File Prior to Visit   Medication Sig Dispense Refill    acetaminophen (TYLENOL) 325 mg tablet Take  by mouth every four (4) hours as needed for Pain.  aspirin delayed-release 81 mg tablet Take  by mouth daily.  Mkbihkzw-Bdrl-Hvvofd-Hyalur Ac (JOINT SUPPORT) 238-936-00-2 mg cap Take  by mouth.  dilTIAZem CD (CARTIA XT) 240 mg ER capsule Take 1 Cap by mouth daily. 90 Cap 1    multivitamin (ONE A DAY) tablet Take 1 Tab by mouth daily. No current facility-administered medications on file prior to visit.         Allergies   Allergen Reactions    Gabapentin Other (comments)     Mental Disturbances    Lyrica [Pregabalin] Other (comments)     Mental Disturbances     Objective:     VS:    Visit Vitals  /80 (BP 1 Location: Right arm, BP Patient Position: Sitting) Comment (BP Patient Position): manual   Pulse 72   Temp 98.8 °F (37.1 °C) (Oral)   Resp 18   Ht 6' 3\" (1.905 m)   Wt 334 lb 12.8 oz (151.9 kg)   SpO2 97%   BMI 41.85 kg/m²     General:   Well-nourished, well-groomed, pleasant, alert, in no acute distress  Head:  Normocephalic, atraumatic  Ears:  External ears WNL  Nose:  External nares WNL  MSK:  +TTP over L5/S1, +SLR tests  Psych:  No pressured speech, no abnormal thought content    PHQ over the last two weeks 8/29/2018   Little interest or pleasure in doing things Not at all   Feeling down, depressed, irritable, or hopeless Not at all   Total Score PHQ 2 0     Lab Results   Component Value Date/Time    Hemoglobin A1c 5.6 08/29/2018 08:58 AM    Glucose 100 (H) 08/29/2018 08:58 AM LDL, calculated 118 (H) 2018 08:58 AM    Creatinine 1.0 2018 08:58 AM       Assessment/Plan & Orders:         ICD-10-CM ICD-9-CM    1. Acute midline low back pain with left-sided sciatica M54.42 724.2 predniSONE (STERAPRED DS) 10 mg dose pack     724.3 cyclobenzaprine (FLEXERIL) 10 mg tablet      naproxen (NAPROSYN) 500 mg tablet      HYDROcodone-acetaminophen (NORCO) 5-325 mg per tablet   2. Essential hypertension I10 401.9    3. Pure hypercholesterolemia E78.00 272.0    4. Obesity, Class III, BMI 40-49.9 (morbid obesity) (HCC) E66.01 278.01      Healthy lifestyle has been encouraged including avoidance of tobacco, limiting or avoiding alcohol intake, heart healthy diet which is low in cholesterol and saturated fat and contains fresh fruits, vegetables and whole grains and fiber, regular exercise with goals of 20-30 minutes 3-5 days weekly and maintaining an optimal BMI. Recommend a statin. LPN will let the pt know   reviewed  Advised pt to call his insurance for a pain management dr if pain persists  Depression screenin18    Follow-up Disposition:  Return in about 4 weeks (around 12/3/2018) for Follow up pain, Follow up weight management, Follow up hypertension, Follow up hyperlipidemia. *Patient verbalized understanding and agreement with the plan. Patient was given an after-visit summary. Nahum Jones.  [address], MD - Internal Medicine  2018, 9:17 AM  [facility]  [facility] [place]anne Shah, [address]  Phone [phone]  Fax [phone]

## 2018-11-05 NOTE — PATIENT INSTRUCTIONS
Learning About How to Have a Healthy Back  What causes back pain? Back pain is often caused by overuse, strain, or injury. For example, people often hurt their backs playing sports or working in the yard, being jolted in a car accident, or lifting something too heavy. Aging plays a part too. Your bones and muscles tend to lose strength as you age, which makes injury more likely. The spongy discs between the bones of the spine (vertebrae) may suffer from wear and tear and no longer provide enough cushion between the bones. A disc that bulges or breaks open (herniated disc) can press on nerves, causing back pain. In some people, back pain is the result of arthritis, broken vertebrae caused by bone loss (osteoporosis), illness, or a spine problem. Although most people have back pain at one time or another, there are steps you can take to make it less likely. How can you have a healthy back? Reduce stress on your back through good posture  Slumping or slouching alone may not cause low back pain. But after the back has been strained or injured, bad posture can make pain worse. · Sleep in a position that maintains your back's normal curves and on a mattress that feels comfortable. Sleep on your side with a pillow between your knees, or sleep on your back with a pillow under your knees. These positions can reduce strain on your back. · Stand and sit up straight. \"Good posture\" generally means your ears, shoulders, and hips are in a straight line. · If you must stand for a long time, put one foot on a stool, ledge, or box. Switch feet every now and then. · Sit in a chair that is low enough to let you place both feet flat on the floor with both knees nearly level with your hips. If your chair or desk is too high, use a footrest to raise your knees. Place a small pillow, a rolled-up towel, or a lumbar roll in the curve of your back if you need extra support.   · Try a kneeling chair, which helps tilt your hips forward. This takes pressure off your lower back. · Try sitting on an exercise ball. It can rock from side to side, which helps keep your back loose. · When driving, keep your knees nearly level with your hips. Sit straight, and drive with both hands on the steering wheel. Your arms should be in a slightly bent position. Reduce stress on your back through careful lifting  · Squat down, bending at the hips and knees only. If you need to, put one knee to the floor and extend your other knee in front of you, bent at a right angle (half kneeling). · Press your chest straight forward. This helps keep your upper back straight while keeping a slight arch in your low back. · Hold the load as close to your body as possible, at the level of your belly button (navel). · Use your feet to change direction, taking small steps. · Lead with your hips as you change direction. Keep your shoulders in line with your hips as you move. · Set down your load carefully, squatting with your knees and hips only. Exercise and stretch your back  · Do some exercise on most days of the week, if your doctor says it is okay. You can walk, run, swim, or cycle. · Stretch your back muscles. Here are a few exercises to try:  ? Lie on your back, and gently pull one bent knee to your chest. Put that foot back on the floor, and then pull the other knee to your chest.  ? Do pelvic tilts. Lie on your back with your knees bent. Tighten your stomach muscles. Pull your belly button (navel) in and up toward your ribs. You should feel like your back is pressing to the floor and your hips and pelvis are slightly lifting off the floor. Hold for 6 seconds while breathing smoothly. ? Sit with your back flat against a wall. · Keep your core muscles strong. The muscles of your back, belly (abdomen), and buttocks support your spine. ? Pull in your belly and imagine pulling your navel toward your spine. Hold this for 6 seconds, then relax.  Remember to keep breathing normally as you tense your muscles. ? Do curl-ups. Always do them with your knees bent. Keep your low back on the floor, and curl your shoulders toward your knees using a smooth, slow motion. Keep your arms folded across your chest. If this bothers your neck, try putting your hands behind your neck (not your head), with your elbows spread apart. ? Lie on your back with your knees bent and your feet flat on the floor. Tighten your belly muscles, and then push with your feet and raise your buttocks up a few inches. Hold this position 6 seconds as you continue to breathe normally, then lower yourself slowly to the floor. Repeat 8 to 12 times. ? If you like group exercise, try Pilates or yoga. These classes have poses that strengthen the core muscles. Lead a healthy lifestyle  · Stay at a healthy weight to avoid strain on your back. · Do not smoke. Smoking increases the risk of osteoporosis, which weakens the spine. If you need help quitting, talk to your doctor about stop-smoking programs and medicines. These can increase your chances of quitting for good. Where can you learn more? Go to http://ta-mary ellen.info/. Enter L315 in the search box to learn more about \"Learning About How to Have a Healthy Back. \"  Current as of: November 29, 2017  Content Version: 11.8  © 0520-8170 Healthwise, Inc[employer]. Care instructions adapted under license by [employer] (which disclaims liability or warranty for this information). If you have questions about a medical condition or this instruction, always ask your healthcare professional. [place] [address] any warranty or liability for your use of this information.

## 2018-11-05 NOTE — PROGRESS NOTES
Jaxon Yates is 59 y.o. male presents today for office visit for follow up for hyperlipidemia and weight management. Pt c/o back pain x 2 days. Pt is not fasting. Pt is in Room# 2.    1. Have you been to the ER, urgent care clinic since your last visit? Hospitalized since your last visit? no    2. Have you seen or consulted any other health care providers outside of the [address] [place] [address] since your last visit? Include any pap smears or colon screening. no    Health Maintenance reviewed.     Upcoming Appts  none    Requested Prescriptions      No prescriptions requested or ordered in this encounter       Visit Vitals  BP (!) 143/95 (BP 1 Location: Right arm, BP Patient Position: Sitting)   Pulse 72   Temp 98.8 °F (37.1 °C) (Oral)   Resp 18   Ht 6' 3\" (1.905 m)   Wt 334 lb 12.8 oz (151.9 kg)   SpO2 97%   BMI 41.85 kg/m²

## 2018-12-03 ENCOUNTER — OFFICE VISIT (OUTPATIENT)
Dept: FAMILY MEDICINE CLINIC | Facility: CLINIC | Age: 58
End: 2018-12-03

## 2018-12-03 VITALS
SYSTOLIC BLOOD PRESSURE: 125 MMHG | WEIGHT: 315 LBS | HEART RATE: 84 BPM | RESPIRATION RATE: 16 BRPM | BODY MASS INDEX: 39.17 KG/M2 | TEMPERATURE: 97.2 F | HEIGHT: 75 IN | DIASTOLIC BLOOD PRESSURE: 78 MMHG | OXYGEN SATURATION: 97 %

## 2018-12-03 DIAGNOSIS — E78.2 MIXED HYPERLIPIDEMIA: ICD-10-CM

## 2018-12-03 DIAGNOSIS — I10 ESSENTIAL HYPERTENSION: Primary | ICD-10-CM

## 2018-12-03 DIAGNOSIS — E66.01 OBESITY, CLASS III, BMI 40-49.9 (MORBID OBESITY) (HCC): ICD-10-CM

## 2018-12-03 NOTE — PATIENT INSTRUCTIONS
Learning About Low-Carbohydrate Diets for Weight Loss  What is a low-carbohydrate diet? Low-carb diets avoid foods that are high in carbohydrate. These high-carb foods include pasta, bread, rice, cereal, fruits, and starchy vegetables. Instead, these diets usually have you eat foods that are high in fat and protein. Many people lose weight quickly on a low-carb diet. But the early weight loss is water. People on this diet often gain the weight back after they start eating carbs again. Not all diet plans are safe or work well. A lot of the evidence shows that low-carb diets aren't healthy. That's because these diets often don't include healthy foods like fruits and vegetables. Losing weight safely means balancing protein, fat, and carbs with every meal and snack. And low-carb diets don't always provide the vitamins, minerals, and fiber you need. If you have a serious medical condition, talk to your doctor before you try any diet. These conditions include kidney disease, heart disease, type 2 diabetes, high cholesterol, and high blood pressure. If you are pregnant, it may not be safe for your baby if you are on a low-carb diet. How can you lose weight safely? You might have heard that a diet plan helped another person lose weight. But that doesn't mean that it will work for you. It is very hard to stay on a diet that includes lots of big changes in your eating habits. If you want to get to a healthy weight and stay there, making healthy lifestyle changes will often work better than dieting. These steps can help. · Make a plan for change. Work with your doctor to create a plan that is right for you. · See a dietitian. He or she can show you how to make healthy changes in your eating habits. · Manage stress. If you have a lot of stress in your life, it can be hard to focus on making healthy changes to your daily habits. · Track your food and activity.  You are likely to do better at losing weight if you keep track of what you eat and what you do. Follow-up care is a key part of your treatment and safety. Be sure to make and go to all appointments, and call your doctor if you are having problems. It's also a good idea to know your test results and keep a list of the medicines you take. Where can you learn more? Go to http://ta-mary ellen.info/. Enter A121 in the search box to learn more about \"Learning About Low-Carbohydrate Diets for Weight Loss. \"  Current as of: March 29, 2018  Content Version: 11.8  © 3672-4182 Healthwise, [employer]. Care instructions adapted under license by [employer] (which disclaims liability or warranty for this information). If you have questions about a medical condition or this instruction, always ask your healthcare professional. [address] any warranty or liability for your use of this information.

## 2018-12-03 NOTE — PROGRESS NOTES
Internal Medicine Progress Note    Today's Date:  12/3/2018   Patient:  Young Net  Patient :  1960    Subjective:     Chief Complaint   Patient presents with    Hypertension    Cholesterol Problem    Pain (Chronic)    Weight Management      Hypertension   This is a chronic problem. BP is at goal. Pt takes dilitiazem. Pt reports compliance with this medication.      Obesity Class III   This is a chronic problem. This is not at goal. Pt went to the [facility] Weight loss program. He was prescribed albuterol and metformin. These were not effective. Pt is on the ketogenic/IF diet with exercise. Pt lost weight since the last visit.      Hyperlipidemia  This is a new problem. This is not at goal. Last FLP was checked on 2018. Pt takes no medication for this. ASCVD risk  is 11.4%.     Past Medical History:   Diagnosis Date    Cervical pain (neck)     Chronic pain     Headache     Hypertension     Mixed hyperlipidemia 2018     Past Surgical History:   Procedure Laterality Date    HX ANKLE FRACTURE TX Left     SX. X4    HX CERVICAL DISKECTOMY      HX CERVICAL FUSION      HX HIP REPLACEMENT Left 2015    after fall on ice    HX KNEE ARTHROSCOPY      HX LUMBAR DISKECTOMY      L4-5, L5-S1 decompression    HX ORTHOPAEDIC      FEMUR FX    HX SHOULDER ARTHROSCOPY        reports that he quit smoking about 38 years ago. He has a 10.00 pack-year smoking history. he has never used smokeless tobacco. He reports that he drinks alcohol. He reports that he does not use drugs.   Family History   Problem Relation Age of Onset    Diabetes Mother     Cancer Mother     Arthritis-osteo Mother     Alcohol abuse Father     Cancer Sister         Non -small cell lung cancer    Cancer Brother         Jawbone    No Known Problems Brother     No Known Problems Brother     No Known Problems Sister     No Known Problems Child     No Known Problems Child      Allergies   Allergen Reactions  Gabapentin Other (comments)     Mental Disturbances    Lyrica [Pregabalin] Other (comments)     Mental Disturbances     Review of Systems   Positives in bold  CV:      chest pain, palpitations  PULM:  SOB, wheezing, cough, sputum production    Current Outpatient Meds and Allergies     Current Outpatient Medications on File Prior to Visit   Medication Sig Dispense Refill    acetaminophen (TYLENOL) 325 mg tablet Take  by mouth every four (4) hours as needed for Pain.  aspirin delayed-release 81 mg tablet Take 81 mg by mouth daily.  Klsnopaf-Gqkm-Uhlcwo-Hyalur Ac (JOINT SUPPORT) 153-391-47-2 mg cap Take  by mouth.  dilTIAZem CD (CARTIA XT) 240 mg ER capsule Take 1 Cap by mouth daily. 90 Cap 1    multivitamin (ONE A DAY) tablet Take 1 Tab by mouth daily. No current facility-administered medications on file prior to visit. Allergies   Allergen Reactions    Gabapentin Other (comments)     Mental Disturbances    Lyrica [Pregabalin] Other (comments)     Mental Disturbances     Objective:     VS:    Visit Vitals  /78 (BP 1 Location: Left arm, BP Patient Position: Sitting)   Pulse 84   Temp 97.2 °F (36.2 °C) (Oral)   Resp 16   Ht 6' 3\" (1.905 m)   Wt 322 lb 12.8 oz (146.4 kg)   SpO2 97%   BMI 40.35 kg/m²     General:   Well-nourished, well-groomed, pleasant, alert, in no acute distress  Head:  Normocephalic, atraumatic  Ears:  External ears WNL  Nose:  External nares WNL  Psych:  No pressured speech, no abnormal thought content    PHQ over the last two weeks 8/29/2018   Little interest or pleasure in doing things Not at all   Feeling down, depressed, irritable, or hopeless Not at all   Total Score PHQ 2 0     Lab Results   Component Value Date/Time    Hemoglobin A1c 5.6 08/29/2018 08:58 AM    Glucose 100 (H) 08/29/2018 08:58 AM    LDL, calculated 118 (H) 08/29/2018 08:58 AM    Creatinine 1.0 08/29/2018 08:58 AM       Assessment/Plan & Orders:         ICD-10-CM ICD-9-CM    1. Essential hypertension I10 401.9    2. Mixed hyperlipidemia E78.2 272.2 LIPID PANEL   3. Obesity, Class III, BMI 40-49.9 (morbid obesity) ([facility]) E66.01 278.01      Healthy lifestyle has been encouraged including avoidance of tobacco, limiting or avoiding alcohol intake, heart healthy diet which is low in cholesterol and saturated fat and contains fresh fruits, vegetables and whole grains and fiber, regular exercise with goals of 20-30 minutes 3-5 days weekly and maintaining an optimal BMI. Recommend a statin. Pt declines at this time. He would like to diet and exercise for the next 3 months  Depression [email or web address]    Follow-up Disposition:  Return in about 3 months (around 3/3/2019) for Weight management, Hyperlipidemia, Hypertension, Pain. *Patient verbalized understanding and agreement with the plan. Patient was given an after-visit summary. Vladislav Denton MD - Internal Medicine  12/3/2018, 9:17 AM  [facility]  [address] [place], [address]  Phone [phone]  Fax [phone]

## 2018-12-03 NOTE — PROGRESS NOTES
Bharti Staples is a 59 y.o.  male presents today for office visit for   Chief Complaint   Patient presents with    Hypertension    Cholesterol Problem    Pain (Chronic)    Weight Management   Pt is not fasting. Pt is in Room # 2.      1. Have you been to the ER, urgent care clinic since your last visit? Hospitalized since your last visit? No    2. Have you seen or consulted any other health care providers outside of the [address] [place] [address] since your last visit? Include any pap smears or colon screening. No    Health Maintenance reviewed. Pt states will get remainder of the Shingrix vaccine series.  Pt states having TDap/Hepatitis C screening last year  Requested Prescriptions      No prescriptions requested or ordered in this encounter       Visit Vitals  /78 (BP 1 Location: Left arm, BP Patient Position: Sitting)   Pulse 84   Temp 97.2 °F (36.2 °C) (Oral)   Resp 16   Ht 6' 3\" (1.905 m)   Wt 322 lb 12.8 oz (146.4 kg)   SpO2 97%   BMI 40.35 kg/m²         Upcoming Appts  No    VORB: No orders of the defined types were placed in this encounter.  /Carolynn Feldman MD/Ada Zuñiga LPN

## 2018-12-10 ENCOUNTER — OFFICE VISIT (OUTPATIENT)
Dept: FAMILY MEDICINE CLINIC | Facility: CLINIC | Age: 58
End: 2018-12-10

## 2018-12-10 VITALS
SYSTOLIC BLOOD PRESSURE: 122 MMHG | OXYGEN SATURATION: 96 % | BODY MASS INDEX: 39.17 KG/M2 | TEMPERATURE: 99.3 F | RESPIRATION RATE: 15 BRPM | HEART RATE: 90 BPM | WEIGHT: 315 LBS | DIASTOLIC BLOOD PRESSURE: 80 MMHG | HEIGHT: 75 IN

## 2018-12-10 DIAGNOSIS — R52 GENERALIZED BODY ACHES: ICD-10-CM

## 2018-12-10 DIAGNOSIS — J20.9 ACUTE BRONCHITIS, UNSPECIFIED ORGANISM: Primary | ICD-10-CM

## 2018-12-10 DIAGNOSIS — I10 ESSENTIAL HYPERTENSION: ICD-10-CM

## 2018-12-10 DIAGNOSIS — J02.9 SORE THROAT: ICD-10-CM

## 2018-12-10 DIAGNOSIS — E66.9 OBESITY, CLASS II, BMI 35-39.9: ICD-10-CM

## 2018-12-10 PROBLEM — E66.01 SEVERE OBESITY (HCC): Status: ACTIVE | Noted: 2018-12-10

## 2018-12-10 RX ORDER — FLUTICASONE PROPIONATE 50 MCG
2 SPRAY, SUSPENSION (ML) NASAL DAILY
Qty: 1 BOTTLE | Refills: 0 | Status: SHIPPED | OUTPATIENT
Start: 2018-12-10

## 2018-12-10 RX ORDER — BENZONATATE 100 MG/1
100 CAPSULE ORAL
Qty: 21 CAP | Refills: 0 | Status: SHIPPED | OUTPATIENT
Start: 2018-12-10 | End: 2018-12-17

## 2018-12-10 NOTE — PROGRESS NOTES
Internal Medicine Progress Note    Today's Date:  12/10/2018   Patient:  Shanika Srinivasan  Patient :  1960    Subjective:     Chief Complaint   Patient presents with    Cold Symptoms     for 4 days      Cough   This is an acute problem. This is not at goal. Symptoms started four days ago. Pt has been taking mucinex, cough drops, tylenol and zycam. +sinus congestion, rhinorrhea and sinus pressure. +some sputum production    Hypertension   This is a chronic problem. BP is at goal. Pt takes dilitiazem. Pt reports compliance with this medication.      Obesity Class II  This is a chronic problem. This is not at goal. [facility]. He was prescribed albuterol and metformin. These were not effective. Pt is on the ketogenic/IF diet with exercise. Pt lost weight since the last visit.      Hyperlipidemia  This is a new problem. This is not at goal. Last FLP was checked on 2018. Pt takes no medication for this. ASCVD risk  is 11.4%.     Past Medical History:   Diagnosis Date    Cervical pain (neck)     Chronic pain     Headache     Hypertension     Mixed hyperlipidemia 2018    Obesity, Class II, BMI 35-39.9 12/10/2018     Past Surgical History:   Procedure Laterality Date    HX ANKLE FRACTURE TX Left     SX. X4    HX CERVICAL DISKECTOMY      HX CERVICAL FUSION      HX HIP REPLACEMENT Left Feb.     after fall on ice    HX KNEE ARTHROSCOPY      HX LUMBAR DISKECTOMY      L4-5, L5-S1 decompression    HX ORTHOPAEDIC      FEMUR FX    HX SHOULDER ARTHROSCOPY        reports that he quit smoking about 38 years ago. He has a 10.00 pack-year smoking history. he has never used smokeless tobacco. He reports that he drinks alcohol. He reports that he does not use drugs.   Family History   Problem Relation Age of Onset    Diabetes Mother     Cancer Mother     Arthritis-osteo Mother     Alcohol abuse Father     Cancer Sister         Non -small cell lung cancer  Cancer Brother         Micheal    No Known Problems Brother     No Known Problems Brother     No Known Problems Sister     No Known Problems Child     No Known Problems Child      Allergies   Allergen Reactions    Gabapentin Other (comments)     Mental Disturbances    Lyrica [Pregabalin] Other (comments)     Mental Disturbances     Review of Systems   Positives in bold  CV:      chest pain, palpitations  PULM:  SOB, wheezing, cough, sputum production    Current Outpatient Meds and Allergies     Current Outpatient Medications on File Prior to Visit   Medication Sig Dispense Refill    acetaminophen (TYLENOL) 325 mg tablet Take  by mouth every four (4) hours as needed for Pain.  aspirin delayed-release 81 mg tablet Take 81 mg by mouth daily.  Vrebiayf-Qjhb-Kqaoar-Hyalur Ac (JOINT SUPPORT) 283-911-83-2 mg cap Take  by mouth.  dilTIAZem CD (CARTIA XT) 240 mg ER capsule Take 1 Cap by mouth daily. 90 Cap 1    multivitamin (ONE A DAY) tablet Take 1 Tab by mouth daily. No current facility-administered medications on file prior to visit.         Allergies   Allergen Reactions    Gabapentin Other (comments)     Mental Disturbances    Lyrica [Pregabalin] Other (comments)     Mental Disturbances     Objective:     VS:    Visit Vitals  /80 (BP 1 Location: Left arm, BP Patient Position: Sitting)   Pulse 90   Temp 99.3 °F (37.4 °C) (Oral)   Resp 15   Ht 6' 3\" (1.905 m)   Wt 315 lb 6.4 oz (143.1 kg)   SpO2 96%   BMI 39.42 kg/m²     General:   Well-nourished, well-groomed, pleasant, alert, in no acute distress  Head:  Normocephalic, atraumatic, MMM, good dentition, oropharynx erythematous but without membranes, exudates, petechiae or ulcers  Ears:  External ears WNL, TMs WNL  Eyes:  EOMI, PERRL  Nose:  External nares WNL  Cardiovascular:   Regular rate and rhythm, no murmurs, no rubs, no gallops  Pulmonary:   Clear breath sounds bilaterally, good air movement, no wheezing, rales or rhonchi, normal respiratory effort  Psych:  No pressured speech, no abnormal thought content    PHQ over the last two weeks 2018   Little interest or pleasure in doing things Not at all   Feeling down, depressed, irritable, or hopeless Not at all   Total Score PHQ 2 0     Lab Results   Component Value Date/Time    Hemoglobin A1c 5.6 2018 08:58 AM    Glucose 100 (H) 2018 08:58 AM    LDL, calculated 118 (H) 2018 08:58 AM    Creatinine 1.0 2018 08:58 AM       Assessment/Plan & Orders:         ICD-10-CM ICD-9-CM    1. Acute bronchitis, unspecified organism J20.9 466.0 benzonatate (TESSALON) 100 mg capsule      fluticasone (FLONASE) 50 mcg/actuation nasal spray   2. Generalized body aches R52 780.96 AMB POC VALERIO INFLUENZA A/B TEST      AMB POC RAPID STREP A   3. Sore throat J02.9 462 AMB POC VALERIO INFLUENZA A/B TEST      AMB POC RAPID STREP A   4. Essential hypertension I10 401.9    5. Obesity, Class II, BMI 35-39.9 E66.9 278.00      Healthy lifestyle has been encouraged including avoidance of tobacco, limiting or avoiding alcohol intake, heart healthy diet which is low in cholesterol and saturated fat and contains fresh fruits, vegetables and whole grains and fiber, regular exercise with goals of 20-30 minutes 3-5 days weekly and maintaining an optimal BMI. Recommended a statin. Pt declined at this time. He would like to diet and exercise for the next 3 months (check on March visit)  Depression screenin18    Follow-up Disposition:  Return if symptoms worsen or fail to improve. *Patient verbalized understanding and agreement with the plan. Patient was given an after-visit summary. Placido Chapman.  Carrol Rashid MD - Internal Medicine  12/10/2018, 9:17 AM  [facility]  [address] [place], [address]  Phone [phone]  Fax [phone]

## 2018-12-10 NOTE — PATIENT INSTRUCTIONS
Bronchitis: Care Instructions  Your Care Instructions    Bronchitis is inflammation of the bronchial tubes, which carry air to the lungs. The tubes swell and produce mucus, or phlegm. The mucus and inflamed bronchial tubes make you cough. You may have trouble breathing. Most cases of bronchitis are caused by viruses like those that cause colds. Antibiotics usually do not help and they may be harmful. Bronchitis usually develops rapidly and lasts about 2 to 3 weeks in otherwise healthy people. Follow-up care is a key part of your treatment and safety. Be sure to make and go to all appointments, and call your doctor if you are having problems. It's also a good idea to know your test results and keep a list of the medicines you take. How can you care for yourself at home? · Take all medicines exactly as prescribed. Call your doctor if you think you are having a problem with your medicine. · Get some extra rest.  · Take an over-the-counter pain medicine, such as acetaminophen (Tylenol), ibuprofen (Advil, Motrin), or naproxen (Aleve) to reduce fever and relieve body aches. Read and follow all instructions on the label. · Do not take two or more pain medicines at the same time unless the doctor told you to. Many pain medicines have acetaminophen, which is Tylenol. Too much acetaminophen (Tylenol) can be harmful. · Take an over-the-counter cough medicine that contains dextromethorphan to help quiet a dry, hacking cough so that you can sleep. Avoid cough medicines that have more than one active ingredient. Read and follow all instructions on the label. · Breathe moist air from a humidifier, hot shower, or sink filled with hot water. The heat and moisture will thin mucus so you can cough it out. · Do not smoke. Smoking can make bronchitis worse. If you need help quitting, talk to your doctor about stop-smoking programs and medicines. These can increase your chances of quitting for good.   When should you call for help? Call 911 anytime you think you may need emergency care. For example, call if:    · You have severe trouble breathing.    Call your doctor now or seek immediate medical care if:    · You have new or worse trouble breathing.     · You cough up dark brown or bloody mucus (sputum).     · You have a new or higher fever.     · You have a new rash.    Watch closely for changes in your health, and be sure to contact your doctor if:    · You cough more deeply or more often, especially if you notice more mucus or a change in the color of your mucus.     · You are not getting better as expected. Where can you learn more? Go to http://ta-mary ellen.info/. Enter H333 in the search box to learn more about \"Bronchitis: Care Instructions. \"  Current as of: December 6, 2017  Content Version: 11.8  © 2330-8602 [employer]. Care instructions adapted under license by [employer] (which disclaims liability or warranty for this information). If you have questions about a medical condition or this instruction, always ask your healthcare professional. [address] any warranty or liability for your use of this information.

## 2018-12-10 NOTE — PROGRESS NOTES
Seema Lopez is a 59 y.o.  male presents today for same day sick visit for   Chief Complaint   Patient presents with    Cold Symptoms     for 4 days   Pt is not fasting. Pt is in Room # 2.      1. Have you been to the ER, urgent care clinic since your last visit? Hospitalized since your last visit? No    2. Have you seen or consulted any other health care providers outside of the [address] [place] [address] since your last visit? Include any pap smears or colon screening. No    Health Maintenance reviewed - deferred per pt.     Requested Prescriptions      No prescriptions requested or ordered in this encounter       Visit Vitals  /80 (BP 1 Location: Left arm, BP Patient Position: Sitting)   Pulse 90   Temp 99.3 °F (37.4 °C) (Oral)   Resp 15   Ht 6' 3\" (1.905 m)   Wt 315 lb 6.4 oz (143.1 kg)   SpO2 96%   BMI 39.42 kg/m²         Upcoming Appts  No    VORB:   Orders Placed This Encounter    AMB POC VALERIO INFLUENZA A/B TEST    AMB POC RAPID STREP MAURA   /Carolynn Walsh MD/Ada Almaguer LPN

## 2019-02-18 ENCOUNTER — TELEPHONE (OUTPATIENT)
Dept: FAMILY MEDICINE CLINIC | Facility: CLINIC | Age: 59
End: 2019-02-18

## 2019-03-01 DIAGNOSIS — I10 ESSENTIAL HYPERTENSION: ICD-10-CM

## 2019-03-01 NOTE — TELEPHONE ENCOUNTER
Patient will need a refil on his BP medication. He is aware that this will be done once Dr. France Luu returns on 3/11.

## 2019-03-09 RX ORDER — DILTIAZEM HYDROCHLORIDE 240 MG/1
240 CAPSULE, COATED, EXTENDED RELEASE ORAL DAILY
Qty: 90 CAP | Refills: 3 | Status: SHIPPED | OUTPATIENT
Start: 2019-03-09

## 2019-04-02 ENCOUNTER — PATIENT OUTREACH (OUTPATIENT)
Dept: FAMILY MEDICINE CLINIC | Facility: CLINIC | Age: 59
End: 2019-04-02

## 2019-05-22 ENCOUNTER — OFFICE VISIT (OUTPATIENT)
Dept: FAMILY MEDICINE CLINIC | Facility: CLINIC | Age: 59
End: 2019-05-22

## 2019-05-22 VITALS
OXYGEN SATURATION: 97 % | WEIGHT: 315 LBS | TEMPERATURE: 98.6 F | HEART RATE: 75 BPM | BODY MASS INDEX: 39.17 KG/M2 | RESPIRATION RATE: 16 BRPM | DIASTOLIC BLOOD PRESSURE: 65 MMHG | HEIGHT: 75 IN | SYSTOLIC BLOOD PRESSURE: 130 MMHG

## 2019-05-22 DIAGNOSIS — Z13.31 SCREENING FOR DEPRESSION: ICD-10-CM

## 2019-05-22 DIAGNOSIS — E78.2 MIXED HYPERLIPIDEMIA: ICD-10-CM

## 2019-05-22 DIAGNOSIS — E66.01 OBESITY, CLASS III, BMI 40-49.9 (MORBID OBESITY) (HCC): Primary | ICD-10-CM

## 2019-05-22 DIAGNOSIS — I10 ESSENTIAL HYPERTENSION: ICD-10-CM

## 2019-05-22 NOTE — PROGRESS NOTES
Internal Medicine Progress Note    Today's Date:  2019   Patient:  Santana Ramirez  Patient :  1960    Subjective:     Chief Complaint   Patient presents with    Hypertension    Weight Management    Cholesterol Problem    Arm Injury     1 month ago       Left elbow pain  This is an acute problem. This is not at goal. Symptoms started three weeks ago. Pt hit his elbow on something. Pain radiates from the elbow. +left arm weakness. Hypertension   This is a chronic problem. BP is at goal. Pt takes dilitiazem. Pt reports compliance with this medication.      Obesity Class II  This is a chronic problem. This is not at goal. [facility] He was prescribed albuterol and metformin. These were not effective. Information was given on the ketogenic/IF diet with exercise during a previous visit. Pt gained weight since the last visit.      Hyperlipidemia  This is a new problem. This is not at goal. Last FLP was checked on 2018. Pt takes no medication for this. ASCVD risk  is 11.4%. Lab Results   Component Value Date/Time    Cholesterol, total 179 2018 08:58 AM    HDL Cholesterol 31 (L) 2018 08:58 AM    LDL, calculated 118 (H) 2018 08:58 AM    VLDL, calculated 30 2018 08:58 AM    Triglyceride 150 (H) 2018 08:58 AM      3 most recent PHQ Screens 2019   Little interest or pleasure in doing things Not at all   Feeling down, depressed, irritable, or hopeless Not at all   Total Score PHQ 2 0     Past Medical History:   Diagnosis Date    Cervical pain (neck)     Chronic pain     Headache     Hypertension     Mixed hyperlipidemia 2018    Obesity, Class II, BMI 35-39.9 12/10/2018     Past Surgical History:   Procedure Laterality Date    HX ANKLE FRACTURE TX Left     SX.  X4    HX CERVICAL DISKECTOMY      HX CERVICAL FUSION      HX HIP REPLACEMENT Left 2015    after fall on ice    HX KNEE ARTHROSCOPY      HX LUMBAR DISKECTOMY  1990    L4-5, L5-S1 decompression    HX ORTHOPAEDIC      FEMUR FX    HX SHOULDER ARTHROSCOPY        reports that he quit smoking about 39 years ago. He has a 10.00 pack-year smoking history. He has never used smokeless tobacco. He reports that he drinks alcohol. He reports that he does not use drugs. Family History   Problem Relation Age of Onset    Diabetes Mother     Cancer Mother     Arthritis-osteo Mother     Alcohol abuse Father     Cancer Sister         Non -small cell lung cancer    Cancer Brother         Jawbone    No Known Problems Brother     No Known Problems Brother     No Known Problems Sister     No Known Problems Child     No Known Problems Child      Allergies   Allergen Reactions    Gabapentin Other (comments)     Mental Disturbances    Lyrica [Pregabalin] Other (comments)     Mental Disturbances     Review of Systems   CV:      chest pain, palpitations  PULM:  SOB, wheezing, cough, sputum production    Current Outpatient Meds and Allergies     Current Outpatient Medications on File Prior to Visit   Medication Sig Dispense Refill    dilTIAZem CD (CARTIA XT) 240 mg ER capsule Take 1 Cap by mouth daily. 90 Cap 3    fluticasone (FLONASE) 50 mcg/actuation nasal spray 2 Sprays by Both Nostrils route daily. 1 Bottle 0    acetaminophen (TYLENOL) 325 mg tablet Take  by mouth every four (4) hours as needed for Pain.  Sptydgrp-Mqjm-Zzxunk-Hyalur Ac (JOINT SUPPORT) 107-208-74-2 mg cap Take  by mouth.  multivitamin (ONE A DAY) tablet Take 1 Tab by mouth daily.  aspirin delayed-release 81 mg tablet Take 81 mg by mouth daily. No current facility-administered medications on file prior to visit.       Allergies   Allergen Reactions    Gabapentin Other (comments)     Mental Disturbances    Lyrica [Pregabalin] Other (comments)     Mental Disturbances     Objective:        Visit Vitals  /65 (BP 1 Location: Left arm, BP Patient Position: Sitting) Comment: manual   Pulse 75   Temp 98.6 °F (37 °C) (Oral)   Resp 16   Ht 6' 3\" (1.905 m)   Wt 344 lb 6.4 oz (156.2 kg)   SpO2 97%   BMI 43.05 kg/m²     General:   Well-nourished, well-groomed, pleasant, alert, in no acute distress  Head:  Normocephalic, atraumatic  Ears:  External ears WNL  Nose:  External nares WNL  Psych:  No pressured speech, no abnormal thought content  Left elbow:  +lateral elbow TTP    Lab Results   Component Value Date/Time    Hemoglobin A1c 5.6 08/29/2018 08:58 AM    Glucose 100 (H) 08/29/2018 08:58 AM    LDL, calculated 118 (H) 08/29/2018 08:58 AM    Creatinine 1.0 08/29/2018 08:58 AM       Assessment/Plan & Orders:         ICD-10-CM ICD-9-CM    1. Obesity, Class III, BMI 40-49.9 (morbid obesity) ([facility] [address] E66.01 278.01    2. Essential hypertension I10 401.9    3. Mixed hyperlipidemia E78.2 272.2    4. Screening for depression Z13.31 V79.0 IA PATIENT SCREENED FOR DEPRESSION     Recommended a statin previously. Pt opted for lifestyle modifications  Continue ketogenic/IF diet with exercise  Elbow brace with NSAIDs for tennis elbow    Follow-up and Dispositions    · Return in about 1 month (around 6/19/2019) for Weight management, Hyperlipidemia, Pain, Go over lab/imaging results. *Patient verbalized understanding and agreement with the plan. Patient was given an after-visit summary. Hiren Grief.  [address], MD - Internal Medicine  5/22/2019, 9:17 AM  [facility]  [address] [place], [address]  Phone [phone]  Fax [phone]

## 2019-05-22 NOTE — PROGRESS NOTES
Suni Vargas is a 59 y.o.  male presents today for office visit for follow up. Pt would also like to discuss HTN. Pt is not fasting. Pt is in Room # 2      1. Have you been to the ER, urgent care clinic since your last visit? Hospitalized since your last visit? No    2. Have you seen or consulted any other health care providers outside of the [address] [place] [address] since your last visit? Include any pap smears or colon screening. No    Upcoming Appts  none    Health Maintenance reviewed     VORB: No orders of the defined types were placed in this encounter.   Melquiades Borja LPN

## 2019-05-23 LAB
CHOLEST SERPL-MCNC: 213 MG/DL (ref 110–200)
HDLC SERPL-MCNC: 32 MG/DL (ref 40–59)
HDLC SERPL-MCNC: 6.7 MG/DL (ref 0–5)
LDLC SERPL CALC-MCNC: 152 MG/DL (ref 50–99)
TRIGL SERPL-MCNC: 144 MG/DL (ref 40–149)
VLDLC SERPL CALC-MCNC: 29 MG/DL (ref 8–30)

## 2019-06-03 ENCOUNTER — PATIENT OUTREACH (OUTPATIENT)
Dept: FAMILY MEDICINE CLINIC | Facility: CLINIC | Age: 59
End: 2019-06-03

## 2019-08-02 ENCOUNTER — PATIENT OUTREACH (OUTPATIENT)
Dept: FAMILY MEDICINE CLINIC | Facility: CLINIC | Age: 59
End: 2019-08-02

## 2019-10-01 ENCOUNTER — PATIENT OUTREACH (OUTPATIENT)
Dept: FAMILY MEDICINE CLINIC | Facility: CLINIC | Age: 59
End: 2019-10-01

## 2019-10-01 NOTE — PROGRESS NOTES
NN health screening:    Mr. Fiorella Cabrera has not rescheduled his OV with [facility] as discussed. Due to insurance he may be committed to [facility] for his care was previously discussed. Closed this episode of care.